# Patient Record
Sex: MALE | Race: BLACK OR AFRICAN AMERICAN | Employment: FULL TIME | URBAN - METROPOLITAN AREA
[De-identification: names, ages, dates, MRNs, and addresses within clinical notes are randomized per-mention and may not be internally consistent; named-entity substitution may affect disease eponyms.]

---

## 2017-11-11 ENCOUNTER — HOSPITAL ENCOUNTER (EMERGENCY)
Age: 47
Discharge: HOME | End: 2017-11-11
Attending: EMERGENCY MEDICINE
Payer: MEDICARE

## 2017-11-11 VITALS
BODY MASS INDEX: 28.98 KG/M2 | OXYGEN SATURATION: 100 % | DIASTOLIC BLOOD PRESSURE: 83 MMHG | HEIGHT: 71 IN | TEMPERATURE: 98 F | WEIGHT: 207 LBS | SYSTOLIC BLOOD PRESSURE: 146 MMHG | HEART RATE: 62 BPM | RESPIRATION RATE: 16 BRPM

## 2017-11-11 DIAGNOSIS — N50.82 SCROTAL PAIN: Primary | ICD-10-CM

## 2017-11-11 PROCEDURE — 99283 EMERGENCY DEPT VISIT LOW MDM: CPT

## 2017-11-11 ASSESSMENT — PAIN DESCRIPTION - LOCATION: LOCATION: SCROTUM

## 2017-11-11 ASSESSMENT — PAIN SCALES - GENERAL: PAINLEVEL_OUTOF10: 5

## 2017-11-12 NOTE — ED PROVIDER NOTES
[address] [facility] ED  eMERGENCY dEPARTMENT eNCOUnter      Pt Name: Vicky Vidal  MRN: [id number]  [place] 1970  Date of evaluation: 11/11/2017  Provider: Murphy Del Cid MD    CHIEF COMPLAINT       Chief Complaint   Patient presents with    Scrotal Pain         HISTORY OF PRESENT ILLNESS   (Location/Symptom, Timing/Onset, Context/Setting, Quality, Duration, Modifying Factors, Severity)  Note limiting factors. Vicky Vidal is a 55 y.o. male who presents to the emergency department With a 4 month history of intermittent left testicular pain that is more noticeable when he is up and walking. He denies any urethral discharge or concern for STD. The history is provided by the patient. Nursing Notes were reviewed. REVIEW OF SYSTEMS    (2-9 systems for level 4, 10 or more for level 5)     Review of Systems   All other systems reviewed and are negative. Except as noted above the remainder of the review of systems was reviewed and negative. PAST MEDICAL HISTORY     Past Medical History:   Diagnosis Date    Hypertension          SURGICAL HISTORY     History reviewed. No pertinent surgical history. CURRENT MEDICATIONS       Previous Medications    NONFORMULARY           ALLERGIES     Review of patient's allergies indicates no known allergies. FAMILY HISTORY     History reviewed. No pertinent family history.        SOCIAL HISTORY       Social History     Social History    Marital status:      Spouse name: N/A    Number of children: N/A    Years of education: N/A     Social History Main Topics    Smoking status: Never Smoker    Smokeless tobacco: Never Used    Alcohol use No    Drug use: No    Sexual activity: Not Asked     Other Topics Concern    None     Social History Narrative    None       SCREENINGS             PHYSICAL EXAM    (up to 7 for level 4, 8 or more for level 5)     ED Triage Vitals [11/11/17 2054]   BP Temp Temp Source Pulse Resp SpO2 Height Weight   (!) 146/83 98 °F (36.7 °C) Oral 62 16 100 % 5' 11\" (1.803 m) 207 lb (93.9 kg)       Physical Exam   Abdominal: Hernia confirmed negative in the right inguinal area and confirmed negative in the left inguinal area. Genitourinary: Testes normal and penis normal. Cremasteric reflex is present. Right testis shows no mass, no swelling and no tenderness. Left testis shows no mass, no swelling and no tenderness. Circumcised. DIAGNOSTIC RESULTS     EKG: All EKG's are interpreted by the Emergency Department Physician who either signs or Co-signs this chart in the absence of a cardiologist.    RADIOLOGY:   Non-plain film images such as CT, Ultrasound and MRI are read by the radiologist. Plain radiographic images are visualized and preliminarily interpreted by the emergency physician with the below findings:    Interpretation per the Radiologist below, if available at the time of this note:    No orders to display         ED BEDSIDE ULTRASOUND:   Performed by ED Physician - none    LABS:  Labs Reviewed - No data to display    All other labs were within normal range or not returned as of this dictation. EMERGENCY DEPARTMENT COURSE and DIFFERENTIAL DIAGNOSIS/MDM:   Vitals:    Vitals:    11/11/17 2054   BP: (!) 146/83   Pulse: 62   Resp: 16   Temp: 98 °F (36.7 °C)   TempSrc: Oral   SpO2: 100%   Weight: 207 lb (93.9 kg)   Height: 5' 11\" (1.803 m)       Patient's examination does not suggest testicular torsion and no masses are felt. He is referred to outpatient urology for further follow-up.     MDM    CONSULTS:  None    PROCEDURES:  Unless otherwise noted below, none     Procedures    FINAL IMPRESSION      1. Scrotal pain          DISPOSITION/PLAN   DISPOSITION Decision to Discharge    PATIENT REFERRED TO:  Jayden Tyler MD  [address] [place] [address]  [phone]    Schedule an appointment as soon as possible for a visit         DISCHARGE MEDICATIONS:  New Prescriptions    No medications on file

## 2024-01-04 ENCOUNTER — TELEPHONE (OUTPATIENT)
Dept: ONCOLOGY | Age: 54
End: 2024-01-04

## 2024-01-04 ENCOUNTER — INITIAL CONSULT (OUTPATIENT)
Dept: ONCOLOGY | Age: 54
End: 2024-01-04
Payer: COMMERCIAL

## 2024-01-04 VITALS
TEMPERATURE: 97.9 F | RESPIRATION RATE: 16 BRPM | BODY MASS INDEX: 30.74 KG/M2 | HEIGHT: 71 IN | HEART RATE: 88 BPM | SYSTOLIC BLOOD PRESSURE: 124 MMHG | WEIGHT: 219.6 LBS | DIASTOLIC BLOOD PRESSURE: 75 MMHG

## 2024-01-04 DIAGNOSIS — D69.9 BLEEDING DIATHESIS (HCC): Primary | ICD-10-CM

## 2024-01-04 DIAGNOSIS — D69.6 ACQUIRED THROMBOCYTOPENIA (HCC): ICD-10-CM

## 2024-01-04 PROCEDURE — 99202 OFFICE O/P NEW SF 15 MIN: CPT

## 2024-01-04 RX ORDER — TADALAFIL 10 MG/1
10 TABLET ORAL DAILY PRN
COMMUNITY
Start: 2023-12-26

## 2024-01-04 RX ORDER — AMLODIPINE AND VALSARTAN 10; 160 MG/1; MG/1
1 TABLET ORAL DAILY
COMMUNITY
Start: 2023-12-26 | End: 2024-01-04

## 2024-01-04 RX ORDER — IBUPROFEN 800 MG/1
800 TABLET ORAL EVERY 8 HOURS PRN
COMMUNITY
Start: 2020-11-22

## 2024-01-04 RX ORDER — ATORVASTATIN CALCIUM 10 MG/1
10 TABLET, FILM COATED ORAL EVERY MORNING
COMMUNITY

## 2024-01-04 RX ORDER — AMLODIPINE BESYLATE 5 MG/1
5 TABLET ORAL EVERY MORNING
COMMUNITY

## 2024-01-04 RX ORDER — CYCLOBENZAPRINE HCL 10 MG
10 TABLET ORAL NIGHTLY PRN
COMMUNITY
Start: 2023-12-26

## 2024-01-04 NOTE — PATIENT INSTRUCTIONS
Labs next week  Rtc in 3 weeks  Please send a copy of my note to the referring physician/add referring physician to communication

## 2024-01-04 NOTE — TELEPHONE ENCOUNTER
KHLOE HERE FOR CONSULTATION  Labs next week  Rtc in 3 weeks  Please send a copy of my note to the referring physician/add referring physician to communication  LABS ORDERS GIVEN TO PT ON EXIT  TO BE DONE NEXT WK  MD NOTES FAXED TO DR MARY ROB OFFICE  MD VISIT 1/25/24 @ 8:15AM  AVS PRINTED W/ INSTRUCTIONS AND GIVEN  TO PT ON EXIT

## 2024-01-04 NOTE — PROGRESS NOTES
Patient ID: Bassam Fernandes, 1970, [id number], 53 y.o.  Referred by :  No ref. provider found   Reason for consultation: epistaxis and thrombocytopenia      HISTORY OF PRESENT ILLNESS:    Oncologic History:    Bassam Fernandes is a very pleasant 53 y.o. male. Patient was referred by PCP for epistaxis and thrombocytopenia. Patient came in today due to nose bleeds which started 5 months ago. He has been having nose bleeds every day from right nostril. It occurs when he is blowing his nose, lasts about 1 minute. He denied any history of blood/bleeding disorder. He denies any history of prolonged bleed in the past. His [facility] from [facility] 12/16/23 showed platelet count of 133, previous platelet count in march, 2022 was 148.   Patient is not on any antiplatelet or anticoagulation medications. Patient does have history of hypertension which is well controlled.   He was told that he has fatty liver    Past Medical History:   Diagnosis Date    Hypertension        History reviewed. No pertinent surgical history.    No Known Allergies    Current Outpatient Medications   Medication Sig Dispense Refill    atorvastatin (LIPITOR) 10 MG tablet Take 1 tablet by mouth every morning      amLODIPine (NORVASC) 5 MG tablet Take 1 tablet by mouth every morning      cyclobenzaprine (FLEXERIL) 10 MG tablet Take 1 tablet by mouth nightly as needed      ibuprofen (ADVIL;MOTRIN) 800 MG tablet Take 1 tablet by mouth every 8 hours as needed      tadalafil (CIALIS) 10 MG tablet Take 1 tablet by mouth daily as needed       No current facility-administered medications for this visit.       Social History     Socioeconomic History    Marital status:      Spouse name: Not on file    Number of children: Not on file    Years of education: Not on file    Highest education level: Not on file   Occupational History    Not on file   Tobacco Use    Smoking status: Never    Smokeless tobacco: Never   Substance and Sexual Activity    Alcohol

## 2024-01-20 ENCOUNTER — HOSPITAL ENCOUNTER (OUTPATIENT)
Age: 54
Discharge: HOME | End: 2024-01-20
Payer: COMMERCIAL

## 2024-01-20 DIAGNOSIS — D69.9 BLEEDING DIATHESIS (HCC): ICD-10-CM

## 2024-01-20 DIAGNOSIS — D69.6 ACQUIRED THROMBOCYTOPENIA (HCC): ICD-10-CM

## 2024-01-20 LAB
CLOSURE TME COLL+ADP BLD: 90 SEC (ref 67–112)
COLLAGEN EPINEPHRINE TIME: 158 SEC (ref 85–172)
FERRITIN SERPL-MCNC: 267 NG/ML (ref 30–400)
IMM RETICS NFR: 19.8 % (ref 2.7–18.3)
IRON SATN MFR SERPL: 29 % (ref 20–55)
IRON SERPL-MCNC: 95 UG/DL (ref 59–158)
PLATELET FUNCTION INTERP: NORMAL
RETIC HEMOGLOBIN: 31.6 PG (ref 28.2–35.7)
RETICS # AUTO: 0.12 M/UL (ref 0.03–0.08)
RETICS/RBC NFR AUTO: 2.5 % (ref 0.5–1.9)
TIBC SERPL-MCNC: 324 UG/DL (ref 250–450)
UNSATURATED IRON BINDING CAPACITY: 229 UG/DL (ref 112–347)

## 2024-01-20 PROCEDURE — 82607 VITAMIN B-12: CPT

## 2024-01-20 PROCEDURE — 36415 COLL VENOUS BLD VENIPUNCTURE: CPT

## 2024-01-20 PROCEDURE — 82728 ASSAY OF FERRITIN: CPT

## 2024-01-20 PROCEDURE — 85576 BLOOD PLATELET AGGREGATION: CPT

## 2024-01-20 PROCEDURE — 83540 ASSAY OF IRON: CPT

## 2024-01-20 PROCEDURE — 82746 ASSAY OF FOLIC ACID SERUM: CPT

## 2024-01-20 PROCEDURE — 85045 AUTOMATED RETICULOCYTE COUNT: CPT

## 2024-01-20 PROCEDURE — 83550 IRON BINDING TEST: CPT

## 2024-01-22 LAB
FOLATE SERPL-MCNC: >20 NG/ML (ref 4.8–24.2)
VIT B12 SERPL-MCNC: 773 PG/ML (ref 232–1245)

## 2025-05-05 ENCOUNTER — HOSPITAL ENCOUNTER (EMERGENCY)
Age: 55
Discharge: HOME | End: 2025-05-05
Attending: EMERGENCY MEDICINE
Payer: COMMERCIAL

## 2025-05-05 ENCOUNTER — APPOINTMENT (OUTPATIENT)
Dept: GENERAL RADIOLOGY | Age: 55
End: 2025-05-05
Payer: COMMERCIAL

## 2025-05-05 VITALS
BODY MASS INDEX: 30.68 KG/M2 | TEMPERATURE: 98.1 F | DIASTOLIC BLOOD PRESSURE: 73 MMHG | OXYGEN SATURATION: 99 % | HEART RATE: 89 BPM | SYSTOLIC BLOOD PRESSURE: 119 MMHG | RESPIRATION RATE: 13 BRPM | WEIGHT: 220 LBS

## 2025-05-05 DIAGNOSIS — M54.9 MUSCULOSKELETAL BACK PAIN: Primary | ICD-10-CM

## 2025-05-05 LAB
EKG ATRIAL RATE: 67 BPM
EKG P AXIS: 70 DEGREES
EKG P-R INTERVAL: 164 MS
EKG Q-T INTERVAL: 372 MS
EKG QRS DURATION: 90 MS
EKG QTC CALCULATION (BAZETT): 393 MS
EKG R AXIS: 5 DEGREES
EKG T AXIS: 18 DEGREES
EKG VENTRICULAR RATE: 67 BPM

## 2025-05-05 PROCEDURE — 93005 ELECTROCARDIOGRAM TRACING: CPT | Performed by: EMERGENCY MEDICINE

## 2025-05-05 PROCEDURE — 93010 ELECTROCARDIOGRAM REPORT: CPT | Performed by: INTERNAL MEDICINE

## 2025-05-05 PROCEDURE — 96372 THER/PROPH/DIAG INJ SC/IM: CPT

## 2025-05-05 PROCEDURE — 99284 EMERGENCY DEPT VISIT MOD MDM: CPT

## 2025-05-05 PROCEDURE — 71101 X-RAY EXAM UNILAT RIBS/CHEST: CPT

## 2025-05-05 PROCEDURE — 6360000002 HC RX W HCPCS: Performed by: EMERGENCY MEDICINE

## 2025-05-05 RX ORDER — ORPHENADRINE CITRATE 30 MG/ML
60 INJECTION INTRAMUSCULAR; INTRAVENOUS ONCE
Status: COMPLETED | OUTPATIENT
Start: 2025-05-05 | End: 2025-05-05

## 2025-05-05 RX ORDER — KETOROLAC TROMETHAMINE 30 MG/ML
30 INJECTION, SOLUTION INTRAMUSCULAR; INTRAVENOUS ONCE
Status: COMPLETED | OUTPATIENT
Start: 2025-05-05 | End: 2025-05-05

## 2025-05-05 RX ORDER — LIDOCAINE 4 G/G
1 PATCH TOPICAL DAILY
Qty: 30 PATCH | Refills: 0 | Status: SHIPPED | OUTPATIENT
Start: 2025-05-05 | End: 2025-06-04

## 2025-05-05 RX ORDER — KETOROLAC TROMETHAMINE 10 MG/1
10 TABLET, FILM COATED ORAL EVERY 6 HOURS PRN
Qty: 20 TABLET | Refills: 0 | Status: SHIPPED | OUTPATIENT
Start: 2025-05-05

## 2025-05-05 RX ORDER — CYCLOBENZAPRINE HCL 10 MG
10 TABLET ORAL 2 TIMES DAILY PRN
Qty: 20 TABLET | Refills: 0 | Status: SHIPPED | OUTPATIENT
Start: 2025-05-05 | End: 2025-05-15

## 2025-05-05 RX ADMIN — ORPHENADRINE CITRATE 60 MG: 60 INJECTION INTRAMUSCULAR; INTRAVENOUS at 04:09

## 2025-05-05 RX ADMIN — KETOROLAC TROMETHAMINE 30 MG: 30 INJECTION, SOLUTION INTRAMUSCULAR at 04:10

## 2025-05-05 ASSESSMENT — ENCOUNTER SYMPTOMS: BACK PAIN: 1

## 2025-05-05 ASSESSMENT — PAIN DESCRIPTION - ORIENTATION: ORIENTATION: LEFT;POSTERIOR;LOWER;UPPER

## 2025-05-05 ASSESSMENT — PAIN DESCRIPTION - LOCATION: LOCATION: BACK

## 2025-05-05 ASSESSMENT — PAIN SCALES - GENERAL: PAINLEVEL_OUTOF10: 10

## 2025-05-05 NOTE — ED PROVIDER NOTES
EMERGENCY DEPARTMENT ENCOUNTER    Pt Name: Bassam Fernandes  MRN: [id number]  Birthdate 1970  Date of evaluation: 5/5/25  CHIEF COMPLAINT       Chief Complaint   Patient presents with    Rib Pain (injury)     Px arrives complaining of L upper back/rib pain that initiated Thursday. Px denies any known injury. Px also denies any chest pain/SOB     HISTORY OF PRESENT ILLNESS   54-year-old male presents emergency room for left upper back pain that started on Thursday.  Patient denies any injury.  He reports he has quite a bit of pain when moving.  It feels like his back is tightening up.  Patient does not have pain with breathing.  He denies any chest pain.  No abdominal pain or lower back pain.  He has not been taking anything at home for pain.             REVIEW OF SYSTEMS     Review of Systems   Musculoskeletal:  Positive for back pain.     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 No pertinent surgical history.  CURRENT MEDICATIONS       Previous Medications    AMLODIPINE (NORVASC) 5 MG TABLET    Take 1 tablet by mouth every morning    ATORVASTATIN (LIPITOR) 10 MG TABLET    Take 1 tablet by mouth every morning    IBUPROFEN (ADVIL;MOTRIN) 800 MG TABLET    Take 1 tablet by mouth every 8 hours as needed    TADALAFIL (CIALIS) 10 MG TABLET    Take 1 tablet by mouth daily as needed     ALLERGIES     has no known allergies.  FAMILY HISTORY     has no family status information on file.      SOCIAL HISTORY       Social History     Tobacco Use    Smoking status: Never    Smokeless tobacco: Never   Substance Use Topics    Alcohol use: No    Drug use: No     PHYSICAL EXAM     INITIAL VITALS: /73   Pulse 89   Temp 98.1 °F (36.7 °C) (Oral)   Resp 13   Wt 99.8 kg (220 lb)   SpO2 99%   BMI 30.68 kg/m²    Physical

## 2025-05-05 NOTE — ED NOTES
Pt presents to the er c/o left posterior upper back pain after moving something that started three days ago